# Patient Record
Sex: MALE | Race: WHITE | HISPANIC OR LATINO | Employment: STUDENT | URBAN - METROPOLITAN AREA
[De-identification: names, ages, dates, MRNs, and addresses within clinical notes are randomized per-mention and may not be internally consistent; named-entity substitution may affect disease eponyms.]

---

## 2019-06-24 ENCOUNTER — HOSPITAL ENCOUNTER (INPATIENT)
Facility: MEDICAL CENTER | Age: 24
LOS: 2 days | DRG: 857 | End: 2019-06-26
Attending: EMERGENCY MEDICINE | Admitting: INTERNAL MEDICINE
Payer: COMMERCIAL

## 2019-06-24 ENCOUNTER — ANESTHESIA EVENT (OUTPATIENT)
Dept: SURGERY | Facility: MEDICAL CENTER | Age: 24
DRG: 857 | End: 2019-06-24
Payer: COMMERCIAL

## 2019-06-24 ENCOUNTER — APPOINTMENT (OUTPATIENT)
Dept: RADIOLOGY | Facility: MEDICAL CENTER | Age: 24
DRG: 857 | End: 2019-06-24
Attending: EMERGENCY MEDICINE
Payer: COMMERCIAL

## 2019-06-24 ENCOUNTER — ANESTHESIA (OUTPATIENT)
Dept: SURGERY | Facility: MEDICAL CENTER | Age: 24
DRG: 857 | End: 2019-06-24
Payer: COMMERCIAL

## 2019-06-24 DIAGNOSIS — K04.7 DENTAL INFECTION: ICD-10-CM

## 2019-06-24 DIAGNOSIS — L02.01 FACIAL ABSCESS: ICD-10-CM

## 2019-06-24 PROBLEM — D72.829 LEUKOCYTOSIS: Status: ACTIVE | Noted: 2019-06-24

## 2019-06-24 PROBLEM — Z72.0 TOBACCO ABUSE: Status: ACTIVE | Noted: 2019-06-24

## 2019-06-24 PROBLEM — R73.9 HYPERGLYCEMIA: Status: ACTIVE | Noted: 2019-06-24

## 2019-06-24 PROBLEM — R06.00 DYSPNEA: Status: ACTIVE | Noted: 2019-06-24

## 2019-06-24 LAB
ALBUMIN SERPL BCP-MCNC: 4.2 G/DL (ref 3.2–4.9)
ALBUMIN/GLOB SERPL: 1.3 G/DL
ALP SERPL-CCNC: 67 U/L (ref 30–99)
ALT SERPL-CCNC: 38 U/L (ref 2–50)
ANION GAP SERPL CALC-SCNC: 11 MMOL/L (ref 0–11.9)
AST SERPL-CCNC: 18 U/L (ref 12–45)
BASOPHILS # BLD AUTO: 0.3 % (ref 0–1.8)
BASOPHILS # BLD: 0.08 K/UL (ref 0–0.12)
BILIRUB SERPL-MCNC: 1.5 MG/DL (ref 0.1–1.5)
BLOOD CULTURE HOLD CXBCH: NORMAL
BUN SERPL-MCNC: 9 MG/DL (ref 8–22)
CALCIUM SERPL-MCNC: 9.6 MG/DL (ref 8.5–10.5)
CHLORIDE SERPL-SCNC: 104 MMOL/L (ref 96–112)
CO2 SERPL-SCNC: 27 MMOL/L (ref 20–33)
CREAT SERPL-MCNC: 0.84 MG/DL (ref 0.5–1.4)
EOSINOPHIL # BLD AUTO: 0.13 K/UL (ref 0–0.51)
EOSINOPHIL NFR BLD: 0.6 % (ref 0–6.9)
ERYTHROCYTE [DISTWIDTH] IN BLOOD BY AUTOMATED COUNT: 43.4 FL (ref 35.9–50)
GLOBULIN SER CALC-MCNC: 3.2 G/DL (ref 1.9–3.5)
GLUCOSE SERPL-MCNC: 104 MG/DL (ref 65–99)
HCT VFR BLD AUTO: 49.5 % (ref 42–52)
HGB BLD-MCNC: 16.6 G/DL (ref 14–18)
IMM GRANULOCYTES # BLD AUTO: 0.25 K/UL (ref 0–0.11)
IMM GRANULOCYTES NFR BLD AUTO: 1.1 % (ref 0–0.9)
LIPASE SERPL-CCNC: 4 U/L (ref 11–82)
LYMPHOCYTES # BLD AUTO: 2.93 K/UL (ref 1–4.8)
LYMPHOCYTES NFR BLD: 12.7 % (ref 22–41)
MCH RBC QN AUTO: 31.7 PG (ref 27–33)
MCHC RBC AUTO-ENTMCNC: 33.5 G/DL (ref 33.7–35.3)
MCV RBC AUTO: 94.5 FL (ref 81.4–97.8)
MONOCYTES # BLD AUTO: 2.04 K/UL (ref 0–0.85)
MONOCYTES NFR BLD AUTO: 8.9 % (ref 0–13.4)
NEUTROPHILS # BLD AUTO: 17.58 K/UL (ref 1.82–7.42)
NEUTROPHILS NFR BLD: 76.4 % (ref 44–72)
NRBC # BLD AUTO: 0 K/UL
NRBC BLD-RTO: 0 /100 WBC
PLATELET # BLD AUTO: 420 K/UL (ref 164–446)
PMV BLD AUTO: 9.8 FL (ref 9–12.9)
POTASSIUM SERPL-SCNC: 3.9 MMOL/L (ref 3.6–5.5)
PROT SERPL-MCNC: 7.4 G/DL (ref 6–8.2)
RBC # BLD AUTO: 5.24 M/UL (ref 4.7–6.1)
SODIUM SERPL-SCNC: 142 MMOL/L (ref 135–145)
WBC # BLD AUTO: 23 K/UL (ref 4.8–10.8)

## 2019-06-24 PROCEDURE — 501838 HCHG SUTURE GENERAL: Performed by: ORAL & MAXILLOFACIAL SURGERY

## 2019-06-24 PROCEDURE — 700102 HCHG RX REV CODE 250 W/ 637 OVERRIDE(OP)

## 2019-06-24 PROCEDURE — 0J910ZZ DRAINAGE OF FACE SUBCUTANEOUS TISSUE AND FASCIA, OPEN APPROACH: ICD-10-PCS | Performed by: ORAL & MAXILLOFACIAL SURGERY

## 2019-06-24 PROCEDURE — A9270 NON-COVERED ITEM OR SERVICE: HCPCS | Performed by: ORAL & MAXILLOFACIAL SURGERY

## 2019-06-24 PROCEDURE — 160002 HCHG RECOVERY MINUTES (STAT): Performed by: ORAL & MAXILLOFACIAL SURGERY

## 2019-06-24 PROCEDURE — 700105 HCHG RX REV CODE 258: Performed by: INTERNAL MEDICINE

## 2019-06-24 PROCEDURE — 770006 HCHG ROOM/CARE - MED/SURG/GYN SEMI*

## 2019-06-24 PROCEDURE — 99407 BEHAV CHNG SMOKING > 10 MIN: CPT | Performed by: INTERNAL MEDICINE

## 2019-06-24 PROCEDURE — 700117 HCHG RX CONTRAST REV CODE 255: Performed by: EMERGENCY MEDICINE

## 2019-06-24 PROCEDURE — 700111 HCHG RX REV CODE 636 W/ 250 OVERRIDE (IP): Performed by: ANESTHESIOLOGY

## 2019-06-24 PROCEDURE — 87070 CULTURE OTHR SPECIMN AEROBIC: CPT

## 2019-06-24 PROCEDURE — 700111 HCHG RX REV CODE 636 W/ 250 OVERRIDE (IP): Performed by: EMERGENCY MEDICINE

## 2019-06-24 PROCEDURE — 160027 HCHG SURGERY MINUTES - 1ST 30 MINS LEVEL 2: Performed by: ORAL & MAXILLOFACIAL SURGERY

## 2019-06-24 PROCEDURE — 87075 CULTR BACTERIA EXCEPT BLOOD: CPT

## 2019-06-24 PROCEDURE — 700111 HCHG RX REV CODE 636 W/ 250 OVERRIDE (IP): Performed by: HOSPITALIST

## 2019-06-24 PROCEDURE — 80053 COMPREHEN METABOLIC PANEL: CPT

## 2019-06-24 PROCEDURE — 96375 TX/PRO/DX INJ NEW DRUG ADDON: CPT

## 2019-06-24 PROCEDURE — 700101 HCHG RX REV CODE 250: Performed by: ANESTHESIOLOGY

## 2019-06-24 PROCEDURE — 99221 1ST HOSP IP/OBS SF/LOW 40: CPT | Mod: 25 | Performed by: INTERNAL MEDICINE

## 2019-06-24 PROCEDURE — 700101 HCHG RX REV CODE 250: Performed by: ORAL & MAXILLOFACIAL SURGERY

## 2019-06-24 PROCEDURE — A9270 NON-COVERED ITEM OR SERVICE: HCPCS

## 2019-06-24 PROCEDURE — 700105 HCHG RX REV CODE 258: Performed by: EMERGENCY MEDICINE

## 2019-06-24 PROCEDURE — 700102 HCHG RX REV CODE 250 W/ 637 OVERRIDE(OP): Performed by: ORAL & MAXILLOFACIAL SURGERY

## 2019-06-24 PROCEDURE — 70355 PANORAMIC X-RAY OF JAWS: CPT

## 2019-06-24 PROCEDURE — 0WB30ZZ EXCISION OF ORAL CAVITY AND THROAT, OPEN APPROACH: ICD-10-PCS | Performed by: ORAL & MAXILLOFACIAL SURGERY

## 2019-06-24 PROCEDURE — 700111 HCHG RX REV CODE 636 W/ 250 OVERRIDE (IP): Performed by: INTERNAL MEDICINE

## 2019-06-24 PROCEDURE — 87040 BLOOD CULTURE FOR BACTERIA: CPT

## 2019-06-24 PROCEDURE — 700105 HCHG RX REV CODE 258: Performed by: HOSPITALIST

## 2019-06-24 PROCEDURE — 96365 THER/PROPH/DIAG IV INF INIT: CPT

## 2019-06-24 PROCEDURE — 160009 HCHG ANES TIME/MIN: Performed by: ORAL & MAXILLOFACIAL SURGERY

## 2019-06-24 PROCEDURE — 99285 EMERGENCY DEPT VISIT HI MDM: CPT

## 2019-06-24 PROCEDURE — 83690 ASSAY OF LIPASE: CPT

## 2019-06-24 PROCEDURE — 160048 HCHG OR STATISTICAL LEVEL 1-5: Performed by: ORAL & MAXILLOFACIAL SURGERY

## 2019-06-24 PROCEDURE — A9270 NON-COVERED ITEM OR SERVICE: HCPCS | Performed by: INTERNAL MEDICINE

## 2019-06-24 PROCEDURE — 160036 HCHG PACU - EA ADDL 30 MINS PHASE I: Performed by: ORAL & MAXILLOFACIAL SURGERY

## 2019-06-24 PROCEDURE — 85025 COMPLETE CBC W/AUTO DIFF WBC: CPT

## 2019-06-24 PROCEDURE — 160035 HCHG PACU - 1ST 60 MINS PHASE I: Performed by: ORAL & MAXILLOFACIAL SURGERY

## 2019-06-24 PROCEDURE — 70491 CT SOFT TISSUE NECK W/DYE: CPT

## 2019-06-24 PROCEDURE — 700102 HCHG RX REV CODE 250 W/ 637 OVERRIDE(OP): Performed by: INTERNAL MEDICINE

## 2019-06-24 PROCEDURE — 160038 HCHG SURGERY MINUTES - EA ADDL 1 MIN LEVEL 2: Performed by: ORAL & MAXILLOFACIAL SURGERY

## 2019-06-24 PROCEDURE — 700105 HCHG RX REV CODE 258: Performed by: ANESTHESIOLOGY

## 2019-06-24 PROCEDURE — 87205 SMEAR GRAM STAIN: CPT

## 2019-06-24 RX ORDER — BISACODYL 10 MG
10 SUPPOSITORY, RECTAL RECTAL
Status: DISCONTINUED | OUTPATIENT
Start: 2019-06-24 | End: 2019-06-26 | Stop reason: HOSPADM

## 2019-06-24 RX ORDER — IBUPROFEN 800 MG/1
800 TABLET ORAL EVERY 8 HOURS PRN
COMMUNITY

## 2019-06-24 RX ORDER — OXYCODONE HYDROCHLORIDE AND ACETAMINOPHEN 5; 325 MG/1; MG/1
1 TABLET ORAL
Status: DISCONTINUED | OUTPATIENT
Start: 2019-06-24 | End: 2019-06-24 | Stop reason: HOSPADM

## 2019-06-24 RX ORDER — MEPERIDINE HYDROCHLORIDE 25 MG/ML
12.5 INJECTION INTRAMUSCULAR; INTRAVENOUS; SUBCUTANEOUS
Status: DISCONTINUED | OUTPATIENT
Start: 2019-06-24 | End: 2019-06-24 | Stop reason: HOSPADM

## 2019-06-24 RX ORDER — HYDROMORPHONE HYDROCHLORIDE 1 MG/ML
0.1 INJECTION, SOLUTION INTRAMUSCULAR; INTRAVENOUS; SUBCUTANEOUS
Status: DISCONTINUED | OUTPATIENT
Start: 2019-06-24 | End: 2019-06-24 | Stop reason: HOSPADM

## 2019-06-24 RX ORDER — SODIUM CHLORIDE, SODIUM LACTATE, POTASSIUM CHLORIDE, CALCIUM CHLORIDE 600; 310; 30; 20 MG/100ML; MG/100ML; MG/100ML; MG/100ML
INJECTION, SOLUTION INTRAVENOUS
Status: DISCONTINUED | OUTPATIENT
Start: 2019-06-24 | End: 2019-06-24 | Stop reason: SURG

## 2019-06-24 RX ORDER — MORPHINE SULFATE 4 MG/ML
4 INJECTION, SOLUTION INTRAMUSCULAR; INTRAVENOUS ONCE
Status: COMPLETED | OUTPATIENT
Start: 2019-06-24 | End: 2019-06-24

## 2019-06-24 RX ORDER — OXYCODONE HCL 5 MG/5 ML
SOLUTION, ORAL ORAL
Status: COMPLETED
Start: 2019-06-24 | End: 2019-06-24

## 2019-06-24 RX ORDER — DIPHENHYDRAMINE HYDROCHLORIDE 50 MG/ML
12.5 INJECTION INTRAMUSCULAR; INTRAVENOUS
Status: DISCONTINUED | OUTPATIENT
Start: 2019-06-24 | End: 2019-06-24 | Stop reason: HOSPADM

## 2019-06-24 RX ORDER — ONDANSETRON 4 MG/1
4 TABLET, ORALLY DISINTEGRATING ORAL EVERY 4 HOURS PRN
Status: DISCONTINUED | OUTPATIENT
Start: 2019-06-24 | End: 2019-06-26 | Stop reason: HOSPADM

## 2019-06-24 RX ORDER — HYDROMORPHONE HYDROCHLORIDE 1 MG/ML
0.2 INJECTION, SOLUTION INTRAMUSCULAR; INTRAVENOUS; SUBCUTANEOUS
Status: DISCONTINUED | OUTPATIENT
Start: 2019-06-24 | End: 2019-06-24 | Stop reason: HOSPADM

## 2019-06-24 RX ORDER — HYDROMORPHONE HYDROCHLORIDE 1 MG/ML
0.4 INJECTION, SOLUTION INTRAMUSCULAR; INTRAVENOUS; SUBCUTANEOUS
Status: DISCONTINUED | OUTPATIENT
Start: 2019-06-24 | End: 2019-06-24 | Stop reason: HOSPADM

## 2019-06-24 RX ORDER — KETOROLAC TROMETHAMINE 30 MG/ML
INJECTION, SOLUTION INTRAMUSCULAR; INTRAVENOUS PRN
Status: DISCONTINUED | OUTPATIENT
Start: 2019-06-24 | End: 2019-06-24 | Stop reason: SURG

## 2019-06-24 RX ORDER — PROMETHAZINE HYDROCHLORIDE 12.5 MG/1
12.5-25 SUPPOSITORY RECTAL EVERY 4 HOURS PRN
Status: DISCONTINUED | OUTPATIENT
Start: 2019-06-24 | End: 2019-06-26 | Stop reason: HOSPADM

## 2019-06-24 RX ORDER — ENALAPRILAT 1.25 MG/ML
1.25 INJECTION INTRAVENOUS EVERY 6 HOURS PRN
Status: DISCONTINUED | OUTPATIENT
Start: 2019-06-24 | End: 2019-06-26 | Stop reason: HOSPADM

## 2019-06-24 RX ORDER — ACETAMINOPHEN 325 MG/1
650 TABLET ORAL EVERY 6 HOURS PRN
Status: DISCONTINUED | OUTPATIENT
Start: 2019-06-24 | End: 2019-06-26 | Stop reason: HOSPADM

## 2019-06-24 RX ORDER — OXYCODONE HYDROCHLORIDE 5 MG/1
5 TABLET ORAL
Status: DISCONTINUED | OUTPATIENT
Start: 2019-06-24 | End: 2019-06-26 | Stop reason: HOSPADM

## 2019-06-24 RX ORDER — POLYETHYLENE GLYCOL 3350 17 G/17G
1 POWDER, FOR SOLUTION ORAL
Status: DISCONTINUED | OUTPATIENT
Start: 2019-06-24 | End: 2019-06-26 | Stop reason: HOSPADM

## 2019-06-24 RX ORDER — DEXAMETHASONE 4 MG/1
2 TABLET ORAL 2 TIMES DAILY
Status: ON HOLD | COMMUNITY
End: 2019-06-26

## 2019-06-24 RX ORDER — AMOXICILLIN 250 MG
2 CAPSULE ORAL 2 TIMES DAILY
Status: DISCONTINUED | OUTPATIENT
Start: 2019-06-24 | End: 2019-06-26 | Stop reason: HOSPADM

## 2019-06-24 RX ORDER — SODIUM CHLORIDE 9 MG/ML
INJECTION, SOLUTION INTRAVENOUS CONTINUOUS
Status: DISCONTINUED | OUTPATIENT
Start: 2019-06-24 | End: 2019-06-26 | Stop reason: HOSPADM

## 2019-06-24 RX ORDER — HYDROMORPHONE HYDROCHLORIDE 1 MG/ML
0.5 INJECTION, SOLUTION INTRAMUSCULAR; INTRAVENOUS; SUBCUTANEOUS
Status: DISCONTINUED | OUTPATIENT
Start: 2019-06-24 | End: 2019-06-26 | Stop reason: HOSPADM

## 2019-06-24 RX ORDER — OXYCODONE HCL 5 MG/5 ML
10 SOLUTION, ORAL ORAL ONCE
Status: COMPLETED | OUTPATIENT
Start: 2019-06-24 | End: 2019-06-24

## 2019-06-24 RX ORDER — HALOPERIDOL 5 MG/ML
1 INJECTION INTRAMUSCULAR
Status: DISCONTINUED | OUTPATIENT
Start: 2019-06-24 | End: 2019-06-24 | Stop reason: HOSPADM

## 2019-06-24 RX ORDER — BUPIVACAINE HYDROCHLORIDE AND EPINEPHRINE 5; 5 MG/ML; UG/ML
INJECTION, SOLUTION EPIDURAL; INTRACAUDAL; PERINEURAL
Status: DISCONTINUED | OUTPATIENT
Start: 2019-06-24 | End: 2019-06-24 | Stop reason: HOSPADM

## 2019-06-24 RX ORDER — PROMETHAZINE HYDROCHLORIDE 25 MG/1
12.5-25 TABLET ORAL EVERY 4 HOURS PRN
Status: DISCONTINUED | OUTPATIENT
Start: 2019-06-24 | End: 2019-06-26 | Stop reason: HOSPADM

## 2019-06-24 RX ORDER — OXYCODONE HYDROCHLORIDE 10 MG/1
10 TABLET ORAL
Status: DISCONTINUED | OUTPATIENT
Start: 2019-06-24 | End: 2019-06-26 | Stop reason: HOSPADM

## 2019-06-24 RX ORDER — OXYCODONE HYDROCHLORIDE AND ACETAMINOPHEN 5; 325 MG/1; MG/1
2 TABLET ORAL
Status: DISCONTINUED | OUTPATIENT
Start: 2019-06-24 | End: 2019-06-24 | Stop reason: HOSPADM

## 2019-06-24 RX ORDER — ONDANSETRON 2 MG/ML
4 INJECTION INTRAMUSCULAR; INTRAVENOUS EVERY 4 HOURS PRN
Status: DISCONTINUED | OUTPATIENT
Start: 2019-06-24 | End: 2019-06-26 | Stop reason: HOSPADM

## 2019-06-24 RX ORDER — DIPHENHYDRAMINE HYDROCHLORIDE 50 MG/ML
25 INJECTION INTRAMUSCULAR; INTRAVENOUS ONCE
Status: COMPLETED | OUTPATIENT
Start: 2019-06-24 | End: 2019-06-24

## 2019-06-24 RX ORDER — SODIUM CHLORIDE, SODIUM LACTATE, POTASSIUM CHLORIDE, CALCIUM CHLORIDE 600; 310; 30; 20 MG/100ML; MG/100ML; MG/100ML; MG/100ML
INJECTION, SOLUTION INTRAVENOUS CONTINUOUS
Status: DISCONTINUED | OUTPATIENT
Start: 2019-06-24 | End: 2019-06-24 | Stop reason: HOSPADM

## 2019-06-24 RX ORDER — ONDANSETRON 2 MG/ML
4 INJECTION INTRAMUSCULAR; INTRAVENOUS
Status: DISCONTINUED | OUTPATIENT
Start: 2019-06-24 | End: 2019-06-24 | Stop reason: HOSPADM

## 2019-06-24 RX ADMIN — ACETAMINOPHEN 650 MG: 325 TABLET, FILM COATED ORAL at 18:00

## 2019-06-24 RX ADMIN — OXYCODONE HYDROCHLORIDE 10 MG: 10 TABLET ORAL at 18:00

## 2019-06-24 RX ADMIN — VANCOMYCIN HYDROCHLORIDE 1300 MG: 100 INJECTION, POWDER, LYOPHILIZED, FOR SOLUTION INTRAVENOUS at 23:46

## 2019-06-24 RX ADMIN — AMPICILLIN AND SULBACTAM 3 G: 1; 2 INJECTION, POWDER, FOR SOLUTION INTRAMUSCULAR; INTRAVENOUS at 16:03

## 2019-06-24 RX ADMIN — IOHEXOL 80 ML: 350 INJECTION, SOLUTION INTRAVENOUS at 15:04

## 2019-06-24 RX ADMIN — AMPICILLIN SODIUM AND SULBACTAM SODIUM 3 G: 2; 1 INJECTION, POWDER, FOR SOLUTION INTRAMUSCULAR; INTRAVENOUS at 22:52

## 2019-06-24 RX ADMIN — VANCOMYCIN HYDROCHLORIDE 2100 MG: 100 INJECTION, POWDER, LYOPHILIZED, FOR SOLUTION INTRAVENOUS at 17:50

## 2019-06-24 RX ADMIN — OXYCODONE HYDROCHLORIDE 10 MG: 5 SOLUTION ORAL at 20:07

## 2019-06-24 RX ADMIN — PROPOFOL 200 MG: 10 INJECTION, EMULSION INTRAVENOUS at 19:10

## 2019-06-24 RX ADMIN — SODIUM CHLORIDE: 9 INJECTION, SOLUTION INTRAVENOUS at 17:50

## 2019-06-24 RX ADMIN — SODIUM CHLORIDE, POTASSIUM CHLORIDE, SODIUM LACTATE AND CALCIUM CHLORIDE: 600; 310; 30; 20 INJECTION, SOLUTION INTRAVENOUS at 18:50

## 2019-06-24 RX ADMIN — KETOROLAC TROMETHAMINE 30 MG: 30 INJECTION, SOLUTION INTRAMUSCULAR at 19:37

## 2019-06-24 RX ADMIN — FENTANYL CITRATE 100 MCG: 50 INJECTION, SOLUTION INTRAMUSCULAR; INTRAVENOUS at 19:10

## 2019-06-24 RX ADMIN — MORPHINE SULFATE 4 MG: 4 INJECTION INTRAVENOUS at 13:51

## 2019-06-24 RX ADMIN — Medication 10 MG: at 20:07

## 2019-06-24 RX ADMIN — MIDAZOLAM HYDROCHLORIDE 2 MG: 1 INJECTION, SOLUTION INTRAMUSCULAR; INTRAVENOUS at 18:50

## 2019-06-24 RX ADMIN — SUCCINYLCHOLINE CHLORIDE 100 MG: 20 INJECTION, SOLUTION INTRAMUSCULAR; INTRAVENOUS at 19:10

## 2019-06-24 RX ADMIN — DIPHENHYDRAMINE HYDROCHLORIDE 25 MG: 50 INJECTION INTRAMUSCULAR; INTRAVENOUS at 23:46

## 2019-06-24 ASSESSMENT — ENCOUNTER SYMPTOMS
LOSS OF CONSCIOUSNESS: 0
PALPITATIONS: 0
WEAKNESS: 0
SHORTNESS OF BREATH: 0
COUGH: 0
FALLS: 0
TINGLING: 0
SHORTNESS OF BREATH: 1
ABDOMINAL PAIN: 0
STRIDOR: 0
HEADACHES: 0
CONSTIPATION: 0
FEVER: 0
CHILLS: 0
DEPRESSION: 0
SPUTUM PRODUCTION: 0
SENSORY CHANGE: 0
DIZZINESS: 0
MYALGIAS: 0
FEVER: 1
VOMITING: 0
NAUSEA: 0
DIARRHEA: 0

## 2019-06-24 ASSESSMENT — COGNITIVE AND FUNCTIONAL STATUS - GENERAL
SUGGESTED CMS G CODE MODIFIER MOBILITY: CH
MOBILITY SCORE: 24
SUGGESTED CMS G CODE MODIFIER DAILY ACTIVITY: CH
DAILY ACTIVITIY SCORE: 24

## 2019-06-24 ASSESSMENT — LIFESTYLE VARIABLES
HAVE YOU EVER FELT YOU SHOULD CUT DOWN ON YOUR DRINKING: NO
ALCOHOL_USE: YES
EVER HAD A DRINK FIRST THING IN THE MORNING TO STEADY YOUR NERVES TO GET RID OF A HANGOVER: NO
ON A TYPICAL DAY WHEN YOU DRINK ALCOHOL HOW MANY DRINKS DO YOU HAVE: 6
AVERAGE NUMBER OF DAYS PER WEEK YOU HAVE A DRINK CONTAINING ALCOHOL: 2
TOTAL SCORE: 2
HAVE PEOPLE ANNOYED YOU BY CRITICIZING YOUR DRINKING: YES
DOES PATIENT WANT TO TALK TO SOMEONE ABOUT QUITTING: NO
TOTAL SCORE: 2
HOW MANY TIMES IN THE PAST YEAR HAVE YOU HAD 5 OR MORE DRINKS IN A DAY: 3
TOTAL SCORE: 2
DOES PATIENT WANT TO STOP DRINKING: YES
CONSUMPTION TOTAL: POSITIVE
EVER FELT BAD OR GUILTY ABOUT YOUR DRINKING: YES
EVER_SMOKED: YES

## 2019-06-24 ASSESSMENT — PATIENT HEALTH QUESTIONNAIRE - PHQ9
2. FEELING DOWN, DEPRESSED, IRRITABLE, OR HOPELESS: NOT AT ALL
SUM OF ALL RESPONSES TO PHQ9 QUESTIONS 1 AND 2: 0
1. LITTLE INTEREST OR PLEASURE IN DOING THINGS: NOT AT ALL

## 2019-06-24 NOTE — ED PROVIDER NOTES
ED Provider Note    Scribed for Tabatha Maldonado M.D. by Austin Diallo. 6/24/2019, 1:00 PM.    Means of arrival: Walk-in  History obtained from: Patient  History limited by: None    CHIEF COMPLAINT  Chief Complaint   Patient presents with   • Facial Swelling     wisdom teeth removed last Tuesday       RENAN Sandhu is a 23 y.o. male who presents to the Emergency Department complaining of acute, worsening facial swelling onset 3 days ago. The patient states that he had his wisdom teeth removed last night, but states this morning he had increased swelling. He notes that he made attempts to follow-up with his dentist, but states that his dentist was unavailable today. The patient endorses associated light pink discharge from surgical incision inside his mouth, pain in the face, and dysphagia, but denies any loss of sensation or dyspnea.  He describes the pain as throbbing and moderate in severity.  The patient states the he has been applying ice to his face to alleviate his pain, but states that his has not alleviated his swelling. He was given antibiotics, steroids, tylenol and codeine to treat post-operative symptoms but states that he finished his medications last night.     REVIEW OF SYSTEMS  Review of Systems   Constitutional: Negative for chills and fever.   HENT:        Positive for facial swelling and facial pain.   Positive for light pink discharge.    Respiratory: Negative for shortness of breath.    Neurological: Negative for sensory change.   All other systems reviewed and are negative.      PAST MEDICAL HISTORY   none    SURGICAL HISTORY  patient denies any surgical history    SOCIAL HISTORY  Social History   Substance Use Topics   • Smoking status: No   • Smokeless tobacco: No   • Alcohol use No        FAMILY HISTORY  Non pertinent     CURRENT MEDICATIONS  Home Medications    non         ALLERGIES  No Known Allergies    PHYSICAL EXAM  VITAL SIGNS: /75   Pulse 80   Temp 36.5 °C (97.7 °F)  "(Temporal)   Resp 14   Ht 1.753 m (5' 9\")   Wt 85.4 kg (188 lb 4.4 oz)   SpO2 98%   BMI 27.80 kg/m²   Vitals reviewed by myself.  Physical Exam  Nursing note and vitals reviewed.  Constitutional: Well-developed and well-nourished.  Patient appears uncomfortable  HENT: Head is notable for significant left-sided facial swelling with associated trismus secondary to the pain in his left jaw.  Difficult to assess oropharynx given patient's trismus.  Patient is managing his secretions without difficulty and is able to speak in full sentences without muffled voice  Eyes: extra-ocular movements intact  Cardiovascular: Normal rate and regular rhythm. No murmur heard.  Pulmonary/Chest: Breath sounds normal. No wheezes or rales.   Abdominal: Soft and non-tender. No distention.    Musculoskeletal: Extremities exhibit normal range of motion without edema or tenderness.   Neurological: Awake and alert  Skin: Skin is warm and dry. No rash.       DIAGNOSTIC STUDIES /  LABS  Labs Reviewed   CBC WITH DIFFERENTIAL - Abnormal; Notable for the following:        Result Value    WBC 23.0 (*)     MCHC 33.5 (*)     Neutrophils-Polys 76.40 (*)     Lymphocytes 12.70 (*)     Immature Granulocytes 1.10 (*)     Neutrophils (Absolute) 17.58 (*)     Monos (Absolute) 2.04 (*)     Immature Granulocytes (abs) 0.25 (*)     All other components within normal limits   COMP METABOLIC PANEL - Abnormal; Notable for the following:     Glucose 104 (*)     All other components within normal limits   LIPASE - Abnormal; Notable for the following:     Lipase 4 (*)     All other components within normal limits   ESTIMATED GFR   BLOOD CULTURE    Narrative:     Per Hospital Policy: Only change Specimen Src: to \"Line\" if  specified by physician order.       All labs reviewed by me.    RADIOLOGY  [id number]   Final Result      Status post left lower wisdom tooth extraction.      CT-SOFT TISSUE NECK WITH   Final Result      4 cm abscess centered deep " and inferior to the left mandibular third molar extraction socket where there is medial cortex slight fracture      Small retropharyngeal fluid could be reactive. This superinfection/abscess is not excluded      Moderate mass effect upon the giuseppe and hypopharyngeal airway from the left inflammatory/phlegmonous change.      Overlying cellulitis      Findings were discussed with WILFREDO MARADIAGA on 6/24/2019 3:20 PM.        The radiologist's interpretation of all radiological studies have been reviewed by me.    REASSESSMENT  1:10 PM - Patient seen and examined at bedside. Discussed plan of care, including ordering labs and imaging. Patient agrees to the plan of care.     3:26 PM - Patient was reevaluated at bedside. Discussed lab and radiology results with the patient and informed them that I would be starting them on IV antibiotics at this time.     3:58 PM - I discussed the patient's case and the above findings with Dr. Rivera (Oral surgery) who stated that he would preform surgery on the patient tonight and asked me to admit the patient to the hospitalist.     4:02 PM - Patient was reevaluated at bedside. I outlined my plan to admit them at this time and the patient was understanding and agreeable to admission.    COURSE & MEDICAL DECISION MAKING  Nursing notes, VS, PMSFHx reviewed in chart.    Patient is a 23-year-old male who comes in for left-sided facial swelling after recent surgery.  Differential diagnosis includes normal postoperative swelling, abscess, dental infection.  Diagnostic work-up includes labs and CT of the face.  Next    Patient's initial vitals are within normal limits, despite his significant facial swelling and trismus patient is still managing his airway, swallowing his secretions normally, and has a normal voice.  Patient's pain is managed with morphine after which he feels improved.  Labs returned and are negative for leukocytosis of [id number].  CT soft tissue neck returns and demonstrates a 4  Center meter abscess that is deep and inferior to the left mandibular third molar with associated swelling and fluid collection in the retropharyngeal space, there is also moderate mass-effect upon the oropharyngeal airway.  Patient is started on vancomycin and Unasyn for infection.  Of note during his emergency department course patient's airway remained intact and he had no respiratory difficulty.  I discussed the case with patient's initial surgeon Dr. Sanchez who advises that he does oral surgery but is not an oral surgeon for this hospital.  Therefore he advises that oral surgeon on-call be consulted.  I discussed the case with Dr. Rivera who advises that patient be admitted and continue antibiotics, he plans to take the patient to surgery for further management of abscess tonight.  Patient understands and is agreeable to this plan.  I discussed the case with Dr. Barriga who is accepted the admission.  At time of admission patient is in guarded condition.      DISPOSITION:  4:03 PM Spoke with Dr. Barriga regarding the patient.  Patient will be admitted in guarded condition.    FINAL IMPRESSION  1. Facial abscess    2. Dental infection         ITabatha M.D. personally performed the services described in this documentation, as scribed by Austin Diallo in my presence, and it is both accurate and complete.    C.    The note accurately reflects work and decisions made by me.  Tabatha Maldonado  6/24/2019  5:47 PM

## 2019-06-24 NOTE — H&P
Hospital Medicine History & Physical Note    Date of Service  6/24/2019    Primary Care Physician  None    Consultants  Oral surgery    Code Status  Full    Chief Complaint  Left facial swelling and pain    History of Presenting Illness  23 y.o. male who presented 6/24/2019 with left facial pain and swelling.  Patient states he has his wisdom teeth out a week ago.  He started having pain and swelling which became worse on Friday.  Today it was markedly worse so he presented to the emergency department.  Patient states over the last couple of nights he has woke up in the middle night with some shortness of breath.  At times he is also noted difficulty swallowing due to pain.  He also complains of a low-grade fever.  Because of the pain with swallowing he has had decreased oral intake.  Upon arrival, patient was noted to have a profound dental abscess.  I did discuss the case including labs and imaging with the ER physician.    Review of Systems  Review of Systems   Constitutional: Positive for fever. Negative for chills and malaise/fatigue.        Decreased oral intake due to pain with swallowing    HENT: Negative for congestion.         Left facial pain and swelling    Respiratory: Positive for shortness of breath. Negative for cough, sputum production and stridor.    Cardiovascular: Negative for chest pain, palpitations and leg swelling.   Gastrointestinal: Negative for abdominal pain, constipation, diarrhea, nausea and vomiting.   Genitourinary: Negative for dysuria and urgency.   Musculoskeletal: Negative for falls and myalgias.   Neurological: Negative for dizziness, tingling, loss of consciousness, weakness and headaches.   Psychiatric/Behavioral: Negative for depression and suicidal ideas.   All other systems reviewed and are negative.      Past Medical History  None    Surgical History  [place] teeth removal    Family History  Reviewed, noncontributory    Social History   Patient does admit to occasional  tobacco use, occasional alcohol use but denies any illicit drug use    Allergies  No Known Allergies    Medications  Prior to Admission Medications   Prescriptions Last Dose Informant Patient Reported? Taking?   acetaminophen-codeine #3 (TYLENOL #3) 300-30 MG Tab 6/23/2019 at PM Patient Yes Yes   Sig: Take 1 Tab by mouth every 8 hours as needed.   dexamethasone (DECADRON) 4 MG Tab 6/23/2019 at PM Patient Yes Yes   Sig: Take 2 mg by mouth 2 times a day.   ibuprofen (MOTRIN) 800 MG Tab 6/23/2019 at PM Patient Yes Yes   Sig: Take 800 mg by mouth every 8 hours as needed (PAIN).      Facility-Administered Medications: None       Physical Exam  Temp:  [36.5 °C (97.7 °F)] 36.5 °C (97.7 °F)  Pulse:  [80-85] 85  Resp:  [14] 14  BP: (141)/(75) 141/75  SpO2:  [97 %-98 %] 97 %    Physical Exam   Constitutional: He is oriented to person, place, and time. He appears well-developed and well-nourished.  Non-toxic appearance. No distress.   HENT:   Head: Normocephalic and atraumatic. Not macrocephalic and not microcephalic. Head is without raccoon's eyes and without Souza's sign.   Right Ear: External ear normal.   Left Ear: External ear normal.   Mouth/Throat: Mucous membranes are dry. No oropharyngeal exudate.   Significant left facial pain and swelling with significant induration  Induration approaches midline    Eyes: Conjunctivae are normal. Right eye exhibits no discharge. Left eye exhibits no discharge. No scleral icterus.   Neck: Normal range of motion. Neck supple. No tracheal deviation, no edema and no erythema present.   Cardiovascular: Normal rate, regular rhythm, normal heart sounds and intact distal pulses.  Exam reveals no gallop, no friction rub and no decreased pulses.    No murmur heard.  Pulmonary/Chest: Effort normal and breath sounds normal. No stridor. No respiratory distress. He has no decreased breath sounds. He has no wheezes. He has no rhonchi. He has no rales. He exhibits no tenderness.   Abdominal: Soft.  Bowel sounds are normal. He exhibits no distension. There is no splenomegaly or hepatomegaly. There is no tenderness. There is no rebound and no guarding.   Musculoskeletal: Normal range of motion. He exhibits no edema, tenderness or deformity.   Lymphadenopathy:     He has no cervical adenopathy.   Neurological: He is alert and oriented to person, place, and time. No cranial nerve deficit. Coordination normal.   Skin: Skin is warm and dry. No rash noted. He is not diaphoretic. No cyanosis or erythema. No pallor. Nails show no clubbing.   Psychiatric: He has a normal mood and affect. His speech is normal and behavior is normal. Judgment and thought content normal. Cognition and memory are normal.   Nursing note and vitals reviewed.      Laboratory:  Recent Labs      06/24/19   1355   WBC  23.0*   RBC  5.24   HEMOGLOBIN  16.6   HEMATOCRIT  49.5   MCV  94.5   MCH  31.7   MCHC  33.5*   RDW  43.4   PLATELETCT  420   MPV  9.8     Recent Labs      06/24/19   1355   SODIUM  142   POTASSIUM  3.9   CHLORIDE  104   CO2  27   GLUCOSE  104*   BUN  9   CREATININE  0.84   CALCIUM  9.6     Recent Labs      06/24/19   1355   ALTSGPT  38   ASTSGOT  18   ALKPHOSPHAT  67   TBILIRUBIN  1.5   LIPASE  4*   GLUCOSE  104*                 No results for input(s): TROPONINI in the last 72 hours.    Urinalysis:    No results found     Imaging:  [id number]   Final Result      Status post left lower wisdom tooth extraction.      CT-SOFT TISSUE NECK WITH   Final Result      4 cm abscess centered deep and inferior to the left mandibular third molar extraction socket where there is medial cortex slight fracture      Small retropharyngeal fluid could be reactive. This superinfection/abscess is not excluded      Moderate mass effect upon the giuseppe and hypopharyngeal airway from the left inflammatory/phlegmonous change.      Overlying cellulitis      Findings were discussed with WILFREDO MARADIAGA on 6/24/2019 3:20 PM.             Assessment/Plan:  I anticipate this patient will require at least two midnights for appropriate medical management, necessitating inpatient admission.    * Dental abscess- (present on admission)   Assessment & Plan    -Post wisdom teeth extraction  -Significant abscess and cellulitis  -Start IV vancomycin and Unasyn  -Currently not causing sepsis  -I did personally review the CT scan, I noted a significant abscess and swelling, it does cause mass-effect on the airway but the airway is still widely patent  -He does require significant IV fluids as well as surgery, is at risk for significant worsening of these are not provided he therefore requires close monitoring in the hospital     Hyperglycemia   Assessment & Plan    -Mild worsening, no need for coverage  -Likely reactive  -Monitor glucose, if it does worsen he may need insulin sliding scale     Dyspnea   Assessment & Plan    -Likely due to mass-effect  -His oxygenation is good, closely monitor for worsening     Leukocytosis- (present on admission)   Assessment & Plan    -Profound elevation due to abscess  -No sepsis at this time  -Start IV fluids  -Repeat CBC in the morning     Tobacco abuse- (present on admission)   Assessment & Plan    -Tobacco cessation counseling and education provided for more than 10 minutes. Nicotine replacement options provided including patch, and further medical treatments including Wellbutrin and chantix.  As well as over the counter options of lozenges and gum.  -He denied the need for patch or gum         VTE prophylaxis: SCDs

## 2019-06-24 NOTE — ED NOTES
Pt presents to ER for facial swelling. PT had his wisdom teeth removed last week and sites this morning his right sided facial swelling became worst. PT is able to take in ful sentences but states its hard to swallow. PT states he finds that he is drooling in his sleep.  PT denies CP, SOB, changes in bowl/bladder, dizziness. PT states he has not called his dentist due to getting his wisdom teeth removed through [employer] and them not being at work today.  PT AOx4. PT in no distress at this time. Call bell within reach, Bed rails up. All needs addressed. Will continue to monitor.

## 2019-06-24 NOTE — PROGRESS NOTES
"Pharmacy Kinetics 23 y.o. male on vancomycin day # 1   2019    Vancomycin New Start     Indication for Treatment: Empiric - dental abscess    Pertinent history per medical record: Admitted on 2019 for dental abscess. Patient had wisdom teeth removed , reports facial swelling this morning. CT revealed dental abscess, thus empiric antibiotics initiated.    Other antibiotics: ampicillin/sulbactam 3 g IV q6hrs    Allergies: Patient has no known allergies.     Concern for renal function includes contrast for CT on .    Pertinent cultures to date:   : peripheral blood cx X2 - in process    MRSA nares swab if pneumonia is a concern (ordered/positive/negative/n-a): N/A    Imagin/24: CT soft tissue/neck - 4 cm abscess centered deep and inferior to the left mandibular third molar extraction socket where there is medial cortex slight fracture. Small retropharyngeal fluid could be reactive. This superinfection/abscess is not excluded.    Recent Labs      19   1355   WBC  23.0*   NEUTSPOLYS  76.40*     Recent Labs      19   1355   BUN  9   CREATININE  0.84   ALBUMIN  4.2     No results for input(s): VANCOTROUGH, VANCOPEAK, VANCORANDOM in the last 72 hours.    Intake/Output Summary (Last 24 hours) at 19 1652  Last data filed at 19 1633   Gross per 24 hour   Intake              100 ml   Output                0 ml   Net              100 ml      /75   Pulse 85   Temp 36.5 °C (97.7 °F) (Temporal)   Resp 14   Ht 1.753 m (5' 9\")   Wt 85.4 kg (188 lb 4.4 oz)   SpO2 97%  Temp (24hrs), Av.5 °C (97.7 °F), Min:36.5 °C (97.7 °F), Max:36.5 °C (97.7 °F)      A/P   1. Vancomycin dose change: Give vanco load 2100 mg IV x1 followed by vanco 1300 mg IV q8hrs  2. Next vancomycin level: ~2 days (not currently ordered)  3. Goal trough: 12-16 mcg/mL  4. Comments: Empiric vancomycin initiated for treatment of dental abscess, blood cultures in process. Patient with normal renal function " and low risk for accumulation. Will start scheduled q8hr vanco regimen per P&T protocol and plan trough at steady state to evaluate and adjust as necessary.     Pharmacy will continue to follow.     Sue Martin, JaradD

## 2019-06-24 NOTE — ED NOTES
Pt ambulates to triage with c/c left facial swelling- pt had wisdom teeth removed on Tuesday, increased swelling this AM.  A&ox4.  Pt to lobby & advised to inform RN of any changes.

## 2019-06-25 ENCOUNTER — PATIENT OUTREACH (OUTPATIENT)
Dept: HEALTH INFORMATION MANAGEMENT | Facility: OTHER | Age: 24
End: 2019-06-25

## 2019-06-25 LAB
ANION GAP SERPL CALC-SCNC: 10 MMOL/L (ref 0–11.9)
BASOPHILS # BLD AUTO: 0.3 % (ref 0–1.8)
BASOPHILS # BLD: 0.05 K/UL (ref 0–0.12)
BUN SERPL-MCNC: 9 MG/DL (ref 8–22)
CALCIUM SERPL-MCNC: 9.4 MG/DL (ref 8.5–10.5)
CHLORIDE SERPL-SCNC: 102 MMOL/L (ref 96–112)
CO2 SERPL-SCNC: 25 MMOL/L (ref 20–33)
CREAT SERPL-MCNC: 0.67 MG/DL (ref 0.5–1.4)
EOSINOPHIL # BLD AUTO: 0.24 K/UL (ref 0–0.51)
EOSINOPHIL NFR BLD: 1.3 % (ref 0–6.9)
ERYTHROCYTE [DISTWIDTH] IN BLOOD BY AUTOMATED COUNT: 44.2 FL (ref 35.9–50)
GLUCOSE SERPL-MCNC: 93 MG/DL (ref 65–99)
GRAM STN SPEC: NORMAL
HCT VFR BLD AUTO: 45.7 % (ref 42–52)
HGB BLD-MCNC: 14.8 G/DL (ref 14–18)
IMM GRANULOCYTES # BLD AUTO: 0.21 K/UL (ref 0–0.11)
IMM GRANULOCYTES NFR BLD AUTO: 1.1 % (ref 0–0.9)
LYMPHOCYTES # BLD AUTO: 2.84 K/UL (ref 1–4.8)
LYMPHOCYTES NFR BLD: 14.9 % (ref 22–41)
MCH RBC QN AUTO: 30.8 PG (ref 27–33)
MCHC RBC AUTO-ENTMCNC: 32.4 G/DL (ref 33.7–35.3)
MCV RBC AUTO: 95.2 FL (ref 81.4–97.8)
MONOCYTES # BLD AUTO: 2.01 K/UL (ref 0–0.85)
MONOCYTES NFR BLD AUTO: 10.6 % (ref 0–13.4)
MRSA DNA SPEC QL NAA+PROBE: NORMAL
NEUTROPHILS # BLD AUTO: 13.68 K/UL (ref 1.82–7.42)
NEUTROPHILS NFR BLD: 71.8 % (ref 44–72)
NRBC # BLD AUTO: 0 K/UL
NRBC BLD-RTO: 0 /100 WBC
PLATELET # BLD AUTO: 370 K/UL (ref 164–446)
PMV BLD AUTO: 10 FL (ref 9–12.9)
POTASSIUM SERPL-SCNC: 4 MMOL/L (ref 3.6–5.5)
RBC # BLD AUTO: 4.8 M/UL (ref 4.7–6.1)
SIGNIFICANT IND 70042: NORMAL
SIGNIFICANT IND 70042: NORMAL
SITE SITE: NORMAL
SITE SITE: NORMAL
SODIUM SERPL-SCNC: 137 MMOL/L (ref 135–145)
SOURCE SOURCE: NORMAL
SOURCE SOURCE: NORMAL
WBC # BLD AUTO: 19 K/UL (ref 4.8–10.8)

## 2019-06-25 PROCEDURE — 700101 HCHG RX REV CODE 250: Performed by: HOSPITALIST

## 2019-06-25 PROCEDURE — 700111 HCHG RX REV CODE 636 W/ 250 OVERRIDE (IP): Performed by: HOSPITALIST

## 2019-06-25 PROCEDURE — 700102 HCHG RX REV CODE 250 W/ 637 OVERRIDE(OP): Performed by: INTERNAL MEDICINE

## 2019-06-25 PROCEDURE — 770006 HCHG ROOM/CARE - MED/SURG/GYN SEMI*

## 2019-06-25 PROCEDURE — 85025 COMPLETE CBC W/AUTO DIFF WBC: CPT

## 2019-06-25 PROCEDURE — 700105 HCHG RX REV CODE 258: Performed by: INTERNAL MEDICINE

## 2019-06-25 PROCEDURE — 80048 BASIC METABOLIC PNL TOTAL CA: CPT

## 2019-06-25 PROCEDURE — A9270 NON-COVERED ITEM OR SERVICE: HCPCS | Performed by: INTERNAL MEDICINE

## 2019-06-25 PROCEDURE — 700105 HCHG RX REV CODE 258: Performed by: HOSPITALIST

## 2019-06-25 PROCEDURE — 36415 COLL VENOUS BLD VENIPUNCTURE: CPT

## 2019-06-25 PROCEDURE — 700111 HCHG RX REV CODE 636 W/ 250 OVERRIDE (IP): Performed by: INTERNAL MEDICINE

## 2019-06-25 PROCEDURE — 99232 SBSQ HOSP IP/OBS MODERATE 35: CPT | Performed by: HOSPITALIST

## 2019-06-25 PROCEDURE — 87641 MR-STAPH DNA AMP PROBE: CPT

## 2019-06-25 RX ORDER — CLINDAMYCIN HYDROCHLORIDE 150 MG/1
300 CAPSULE ORAL EVERY 6 HOURS
Status: DISCONTINUED | OUTPATIENT
Start: 2019-06-26 | End: 2019-06-26 | Stop reason: HOSPADM

## 2019-06-25 RX ORDER — CLINDAMYCIN PHOSPHATE 600 MG/50ML
600 INJECTION, SOLUTION INTRAVENOUS EVERY 8 HOURS
Status: COMPLETED | OUTPATIENT
Start: 2019-06-25 | End: 2019-06-25

## 2019-06-25 RX ADMIN — SENNOSIDES, DOCUSATE SODIUM 2 TABLET: 50; 8.6 TABLET, FILM COATED ORAL at 16:47

## 2019-06-25 RX ADMIN — AMPICILLIN SODIUM AND SULBACTAM SODIUM 3 G: 2; 1 INJECTION, POWDER, FOR SOLUTION INTRAMUSCULAR; INTRAVENOUS at 05:02

## 2019-06-25 RX ADMIN — ACETAMINOPHEN 650 MG: 325 TABLET, FILM COATED ORAL at 20:37

## 2019-06-25 RX ADMIN — CLINDAMYCIN IN 5 PERCENT DEXTROSE 600 MG: 12 INJECTION, SOLUTION INTRAVENOUS at 22:11

## 2019-06-25 RX ADMIN — SENNOSIDES, DOCUSATE SODIUM 2 TABLET: 50; 8.6 TABLET, FILM COATED ORAL at 05:02

## 2019-06-25 RX ADMIN — VANCOMYCIN HYDROCHLORIDE 1300 MG: 100 INJECTION, POWDER, LYOPHILIZED, FOR SOLUTION INTRAVENOUS at 16:04

## 2019-06-25 RX ADMIN — OXYCODONE HYDROCHLORIDE 5 MG: 5 TABLET ORAL at 08:18

## 2019-06-25 RX ADMIN — SODIUM CHLORIDE: 9 INJECTION, SOLUTION INTRAVENOUS at 18:54

## 2019-06-25 RX ADMIN — OXYCODONE HYDROCHLORIDE 10 MG: 10 TABLET ORAL at 14:12

## 2019-06-25 RX ADMIN — AMPICILLIN SODIUM AND SULBACTAM SODIUM 3 G: 2; 1 INJECTION, POWDER, FOR SOLUTION INTRAMUSCULAR; INTRAVENOUS at 16:47

## 2019-06-25 RX ADMIN — CLINDAMYCIN IN 5 PERCENT DEXTROSE 600 MG: 12 INJECTION, SOLUTION INTRAVENOUS at 17:23

## 2019-06-25 RX ADMIN — AMPICILLIN SODIUM AND SULBACTAM SODIUM 3 G: 2; 1 INJECTION, POWDER, FOR SOLUTION INTRAMUSCULAR; INTRAVENOUS at 12:35

## 2019-06-25 RX ADMIN — OXYCODONE HYDROCHLORIDE 10 MG: 10 TABLET ORAL at 05:01

## 2019-06-25 RX ADMIN — OXYCODONE HYDROCHLORIDE 10 MG: 10 TABLET ORAL at 11:27

## 2019-06-25 RX ADMIN — OXYCODONE HYDROCHLORIDE 10 MG: 10 TABLET ORAL at 18:54

## 2019-06-25 RX ADMIN — VANCOMYCIN HYDROCHLORIDE 1300 MG: 100 INJECTION, POWDER, LYOPHILIZED, FOR SOLUTION INTRAVENOUS at 08:11

## 2019-06-25 ASSESSMENT — ENCOUNTER SYMPTOMS
SPUTUM PRODUCTION: 0
HALLUCINATIONS: 0
STRIDOR: 0
NERVOUS/ANXIOUS: 0
MYALGIAS: 0
FEVER: 0
EYE PAIN: 0
BLOOD IN STOOL: 0
LOSS OF CONSCIOUSNESS: 0
SORE THROAT: 0
SEIZURES: 0
SPEECH CHANGE: 0
ABDOMINAL PAIN: 0
FOCAL WEAKNESS: 0
VOMITING: 0
FLANK PAIN: 0
HEMOPTYSIS: 0
EYE DISCHARGE: 0
SHORTNESS OF BREATH: 0
CHILLS: 0
EYE REDNESS: 0
PALPITATIONS: 0
COUGH: 0
BRUISES/BLEEDS EASILY: 0
DIARRHEA: 0

## 2019-06-25 NOTE — DISCHARGE INSTRUCTIONS
Discharge Instructions    Discharged to home by car with relative. Discharged via walking, hospital escort: Refused.  Special equipment needed: Not Applicable    Be sure to schedule a follow-up appointment with your primary care doctor or any specialists as instructed.     Discharge Plan:   Diet Plan: Discussed  Activity Level: Discussed  Confirmed Follow up Appointment: Patient to Call and Schedule Appointment  Confirmed Symptoms Management: Discussed  Medication Reconciliation Updated: Yes  Influenza Vaccine Indication: Patient Refuses    I understand that a diet low in cholesterol, fat, and sodium is recommended for good health. Unless I have been given specific instructions below for another diet, I accept this instruction as my diet prescription.   Other diet: Regular as tolerated    Special Instructions: None    · Is patient discharged on Warfarin / Coumadin?   No     Depression / Suicide Risk    As you are discharged from this [facility] facility, it is important to learn how to keep safe from harming yourself.    Recognize the warning signs:  · Abrupt changes in personality, positive or negative- including increase in energy   · Giving away possessions  · Change in eating patterns- significant weight changes-  positive or negative  · Change in sleeping patterns- unable to sleep or sleeping all the time   · Unwillingness or inability to communicate  · Depression  · Unusual sadness, discouragement and loneliness  · Talk of wanting to die  · Neglect of personal appearance   · Rebelliousness- reckless behavior  · Withdrawal from people/activities they love  · Confusion- inability to concentrate     If you or a loved one observes any of these behaviors or has concerns about self-harm, here's what you can do:  · Talk about it- your feelings and reasons for harming yourself  · Remove any means that you might use to hurt yourself (examples: pills, rope, extension cords, firearm)  · Get professional help from the  community (Mental Health, Substance Abuse, psychological counseling)  · Do not be alone:Call your Safe Contact- someone whom you trust who will be there for you.  · Call your local CRISIS HOTLINE 724-3306 or 726-388-2112  · Call your local Children's Mobile Crisis Response Team [place] (321) 609-7367 or www.Sabesim  · Call the toll free National Suicide Prevention Hotlines   · National Suicide Prevention Lifeline 046-905-UVYJ (7705)  · [employer] Hope Line Network 800-SUICIDE (552-5871)        Dental Abscess  A dental abscess is a collection of pus in or around a tooth.  What are the causes?  This condition is caused by a bacterial infection around the root of the tooth that involves the inner part of the tooth (pulp). It may result from:  · Severe tooth decay.  · Trauma to the tooth that allows bacteria to enter into the pulp, such as a broken or chipped tooth.  · Severe gum disease around a tooth.  What are the signs or symptoms?  Symptoms of this condition include:  · Severe pain in and around the infected tooth.  · Swelling and redness around the infected tooth, in the mouth, or in the face.  · Tenderness.  · Pus drainage.  · Bad breath.  · Bitter taste in the mouth.  · Difficulty swallowing.  · Difficulty opening the mouth.  · Nausea.  · Vomiting.  · Chills.  · Swollen neck glands.  · Fever.  How is this diagnosed?  This condition is diagnosed with examination of the infected tooth. During the exam, your dentist may tap on the infected tooth. Your dentist will also ask about your medical and dental history and may order X-rays.  How is this treated?  This condition is treated by eliminating the infection. This may be done with:  · Antibiotic medicine.  · A root canal. This may be performed to save the tooth.  · Pulling (extracting) the tooth. This may also involve draining the abscess. This is done if the tooth cannot be saved.  Follow these instructions at home:  · Take medicines only as directed  by your dentist.  · If you were prescribed antibiotic medicine, finish all of it even if you start to feel better.  · Rinse your mouth (gargle) often with salt water to relieve pain or swelling.  · Do not drive or operate heavy machinery while taking pain medicine.  · Do not apply heat to the outside of your mouth.  · Keep all follow-up visits as directed by your dentist. This is important.  Contact a health care provider if:  · Your pain is worse and is not helped by medicine.  Get help right away if:  · You have a fever or chills.  · Your symptoms suddenly get worse.  · You have a very bad headache.  · You have problems breathing or swallowing.  · You have trouble opening your mouth.  · You have swelling in your neck or around your eye.  This information is not intended to replace advice given to you by your health care provider. Make sure you discuss any questions you have with your health care provider.  Document Released: 12/18/2006 Document Revised: 04/27/2017 Document Reviewed: 12/15/2015  [employer] Interactive Patient Education © 2017 [employer]        Antibiotic Medicine  Antibiotic medicines are used to treat infections caused by bacteria. They work by injuring or killing the bacteria that is making you sick.  HOW IS AN ANTIBIOTIC CHOSEN?  An antibiotic is chosen based on many factors. To help your health care provider choose one for you, tell your health care provider if:  · You have any allergies.  · You are pregnant or plan to get pregnant.  · You are breastfeeding.  · You are taking any medicines. These include over-the-counter medicines, prescription medicines, and herbal remedies.  · You have a medical condition or problem you have not already discussed.  Your health care provider will also consider:  · How often the medicine has to be taken.  · Common side effects of the medicine.  · The cost of the medicine.  · The taste of the medicine.  If you have questions about why an antibiotic was chosen,  make sure to ask.  FOR HOW LONG SHOULD I TAKE MY ANTIBIOTIC?  Continue to take your antibiotic for as long as told by your health care provider. Do not stop taking it when you feel better. If you stop taking it too soon:  · You may start to feel sick again.  · Your infection may become harder to treat.  · Complications may develop.  WHAT IF I MISS A DOSE?  Try not to miss any doses of medicine. If you miss a dose, take it as soon as possible. However, if it is almost time for the next dose:  · If you are taking 2 doses per day, take the missed dose and the next dose 5 to 6 hours apart.  · If you are taking 3 or more doses per day, take the missed dose and the next dose 2 to 4 hours apart, then go back to the normal schedule.  If you cannot make up a missed dose, take the next scheduled dose on time. Then take the missed dose after you have taken all the doses as recommended by your health care provider, as if you had one more dose left.  DO ANTIBIOTICS AFFECT BIRTH CONTROL?  Birth control pills may not work while you are on antibiotics. If you are taking birth control pills, continue taking them as usual and use a second form of birth control, such as a condom, to avoid unwanted pregnancy. Continue using the second form of birth control until you are finished with your current 1 month cycle of birth control pills.  OTHER INFORMATION  · If there is any medicine left over, throw it away.  · Never take someone else's antibiotics.  · Never take leftover antibiotics.  SEEK MEDICAL CARE IF:  · You get worse.  · You do not feel better within a few days of starting the antibiotic medicine.  · You vomit.  · White patches appear in your mouth.  · You have new joint pain that begins after starting the antibiotic.  · You have new muscle aches that begin after starting the antibiotic.  · You had a fever before starting the antibiotic and it returns.  · You have any symptoms of an allergic reaction, such as an itchy rash. If this  happens, stop taking the antibiotic.  SEEK IMMEDIATE MEDICAL CARE IF:  · Your urine turns dark or becomes blood-colored.  · Your skin turns yellow.  · You bruise or bleed easily.  · You have severe diarrhea and abdominal cramps.  · You have a severe headache.  · You have signs of a severe allergic reaction, such as:  ¨ Trouble breathing.  ¨ Wheezing.  ¨ Swelling of the lips, tongue, or face.  ¨ Fainting.  ¨ Blisters on the skin or in the mouth.  If you have signs of a severe allergic reaction, stop taking the antibiotic right away.  This information is not intended to replace advice given to you by your health care provider. Make sure you discuss any questions you have with your health care provider.  Document Released: 08/30/2005 Document Revised: 09/07/2016 Document Reviewed: 05/04/2016  [employer] Interactive Patient Education © 2017 [employer]

## 2019-06-25 NOTE — PROGRESS NOTES
Report received from ER RN  Assumed care of patient at 1750.    Pt is A&O x 4.  Pain reported at 8/10. Medicated per MAR  Nausea denied  NPO   Swelling a redness noted to lt cheek/jaw. Ice pack in place.    Family and bf updated on POC by pt.   Bed in lowest position and locked.  Pt resting comfortably now.  Review plan of care with patient  Call light within reach  Hourly rounds in place  All needs met at this time

## 2019-06-25 NOTE — ANESTHESIA PROCEDURE NOTES
Airway  Date/Time: 6/24/2019 7:23 PM  Performed by: JOSSY MCMILLAN  Authorized by: JOSSY MCMILLAN     Location:  OR  Urgency:  Elective  Indications for Airway Management:  Anesthesia  Spontaneous Ventilation: absent    Sedation Level:  Deep  Preoxygenated: Yes    Patient Position:  Sniffing  Final Airway Type:  Endotracheal airway  Final Endotracheal Airway:  ETT  Cuffed: Yes    Technique Used for Successful ETT Placement:  Direct laryngoscopy  Insertion Site:  Oral  Blade Type:  Butch  Laryngoscope Blade/Videolaryngoscope Blade Size:  4  ETT Size (mm):  7.5  Measured from:  Teeth  ETT to Teeth (cm):  25  Placement Verified by: auscultation and capnometry    Cormack-Lehane Classification:  Grade I - full view of glottis  Number of Attempts at Approach:  1

## 2019-06-25 NOTE — ASSESSMENT & PLAN NOTE
S/p teeth #17 extraction  Significant abscess and cellulitis  On IV vancomycin and Unasyn, pending cultures

## 2019-06-25 NOTE — PROGRESS NOTES
"Pharmacy Kinetics 23 y.o. male on vancomycin day # 2 2019    Currently on Vancomycin 1300 mg iv q8hr (00,08,16)    Indication for Treatment: Facial abscess after removal of wisdom teeth 3 days prior to presentation    Pertinent history per medical record: Admitted on 2019 for worsening facial swelling after removal of wisdom teeth 3 days prior to presentation. His dentist was unavailable for follow up and he noted light pink discharge from surgical incision; along with pain and dysphagia. CT states possible superinfection/abscess inferior to L mandibular third molar extraction socket with a medial cortex slight fracture.    Other antibiotics:   Ampicillin/sulbactam 3g iv q6hrs    Allergies: Patient has no allergy information on record.     List concerns for renal function: Contrast     Pertinent cultures to date:    - Left neck abscess - Prelim GPCs, GPRs, and GNRs on GS    MRSA nares swab if pneumonia is a concern (ordered/positive/negative/n-a): N/A    Recent Labs      19   1355  19   0357   WBC  23.0*  19.0*   NEUTSPOLYS  76.40*  71.80     Recent Labs      19   1355  19   0357   BUN  9  9   CREATININE  0.84  0.67   ALBUMIN  4.2   --      Intake/Output Summary (Last 24 hours) at 19 0846  Last data filed at 19 0800   Gross per 24 hour   Intake          2384.45 ml   Output                5 ml   Net          2379.45 ml      /79   Pulse 93   Temp 37.1 °C (98.8 °F) (Temporal)   Resp 20   Ht 1.753 m (5' 9\")   Wt 85.4 kg (188 lb 4.4 oz)   SpO2 95%  Temp (24hrs), Av.8 °C (98.3 °F), Min:36.1 °C (96.9 °F), Max:37.9 °C (100.3 °F)      A/P   1. Vancomycin dose change: No change  2. Next vancomycin level: 19 @ 07  3. Goal trough: 12-16 mcg/mL  4. Comments: Urine output per documentation is inadequate. Patient has minimal risk factors for supratherapeutic trough. Will order trough tomorrow at 07 to assess level and adjust as necessary. Pharmacy will " continue to monitor daily.    Lesley Piedra, JaradD

## 2019-06-25 NOTE — CARE PLAN
Problem: Infection  Goal: Will remain free from infection  Outcome: PROGRESSING AS EXPECTED  Hand hygiene and oral care education provided    Problem: Pain Management  Goal: Pain level will decrease to patient's comfort goal  Outcome: PROGRESSING AS EXPECTED  Pain is well controlled with ice packs

## 2019-06-25 NOTE — OP REPORT
DATE OF SERVICE:  06/24/2019    PREOPERATIVE DIAGNOSES:  Severe left submandibular space abscess.    POSTOPERATIVE DIAGNOSES:  Severe left submandibular space abscess.    PROCEDURES:  Incision and drainage of the left neck abscess and debridement of   extraction site #17.    INDICATIONS:  This 23-year-old male patient had his wisdom teeth removed by   general dentist several days ago.  He has developed a severe postoperative   infection and was sent to the emergency room for treatment by an oral surgeon.    The patient's infection was large enough that it needed to be drained in the   operating room under general anesthetic.  The risks and benefits of this type   of procedure were explained to the patient in detail prior to going to the   operating room and consent was obtained.    OPERATIVE NOTE:  The patient was brought to the main operating room at [facility].  A general anesthetic was induced.  The patient was intubated   without difficulty.  He was then prepped and draped in the typical manner for   an oral surgery procedure.  Approximately 7 mL of 0.5% Marcaine with   epinephrine was infiltrated around the left neck and lower left inferior   alveolar nerve.  Attention was first directed to the left neck.  About 1-inch   incision was made below the inferior border of the mandible in the left neck.    A hemostat was used to open up the abscess.  A large amount of pus was   expressed.  Aerobic and anaerobic cultures were taken.  Next using blunt   dissection with my finger, we opened up the left submandibular space and   irrigated it with saline.    We then went inside the mouth and reopened the #17 socket.  It appeared to   have some sort of necrotic debris deep in the socket.  This was curetted out.    There were multiple sharp edges of bone around the extraction site.  This   bone was filed down with a bone file.  The extraction site was left open and   flushed again with saline.  At this point in  time, we went back to the left   neck.  Two 0.25 inch Penrose drains were sutured into the lower left   submandibular space, one more posterior and one more anterior.  These were   sutured into place with interrupted 2-0 silk sutures.  At this point in time,   the procedure was complete.  The moistened throat pack was removed from the   back of the throat.  A dressing was placed on the left neck.  The patient was   awakened from his general anesthetic, having tolerated the procedure well.    DISPOSITION:  To postoperative recovery in stable and satisfactory condition.    He is currently admitted to the hospitalist.  He will most likely need 1-2   days of IV antibiotics as this is a pretty severe infection.  The drains will   probably be left in place for 5-7 days.  Most likely, he will be discharged   home with the Penrose drains in place.  Please contact me with any questions.    My office phone number is #[phone].    ESTIMATED BLOOD LOSS:  None.    SPECIMENS:  Aerobic and anaerobic cultures.       ____________________________________     MILTON HARMON MD,DDS    DAYNA / MARIZA    DD:  06/24/2019 19:44:14  DT:  06/24/2019 20:43:07    D#:  [id number]  Job#:  [id number]

## 2019-06-25 NOTE — PROGRESS NOTES
"Received report from PAC-U.  Pt arrived to T432 bed 2 via gurney  No immediate distress.  A&Ox4  Denies pain  Denies n/v  Two penrose drains to left side of neck   Draining serosanguinous    Patient currently running vanco from earlier.     Patient states, \"I feel really hot,\"  Patient appears to be flushed in the face and body   Possible reaction to Vanco  Updated hospitalist slowed down the rate of infusion.         Oriented to room, educated on welcome packet, white board, TV, call light, call before fall, plan of care, oral care expectations, ambulation goals, and up to chair for all meals goal.  Call light and personal belongings within reach.  Fall precautions and hourly rounding in place  "

## 2019-06-25 NOTE — PROGRESS NOTES
Assumed care of patient. Patient stating pain to left jaw/neck is a 7/10. Medicated per MAR. Left cheek and under jaw is swollen. Cheeks are red. Kerlix and gauze dressing noted to left neck. No drainage noted at this time. IV antibiotics administered. No other needs at this time.

## 2019-06-25 NOTE — ANESTHESIA PREPROCEDURE EVALUATION
Relevant Problems   No relevant active problems       Physical Exam    Airway   Mallampati: II  TM distance: >3 FB  Neck ROM: full       Cardiovascular - normal exam  Rhythm: regular  Rate: normal  (-) murmur     Dental - normal exam         Pulmonary - normal exam  Breath sounds clear to auscultation     Abdominal    Neurological - normal exam                 Anesthesia Plan    ASA 2 - emergent   ASA physical status emergent criteria: acute deteriorating condition due to infection    Plan - general       Airway plan will be ETT        Induction: intravenous    Postoperative Plan: Postoperative administration of opioids is intended.    Pertinent diagnostic labs and testing reviewed    Informed Consent:    Anesthetic plan and risks discussed with patient.    Use of blood products discussed with: patient whom consented to blood products.

## 2019-06-25 NOTE — ANESTHESIA QCDR
2019 [facility] Clinical Data Registry (for Quality Improvement)     Postoperative nausea/vomiting risk protocol (Adult = 18 yrs and Pediatric 3-17 yrs)- (430 and 463)  General inhalation anesthetic (NOT TIVA) with PONV risk factors: Yes  Provision of anti-emetic therapy with at least 2 different classes of agents: Yes   Patient DID NOT receive anti-emetic therapy and reason is documented in Medical Record:  N/A    Multimodal Pain Management- (AQI59)  Patient undergoing Elective Surgery (i.e. Outpatient, or ASC, or Prescheduled Surgery prior to Hospital Admission): Yes  Use of Multimodal Pain Management, two or more drugs and/or interventions, NOT including systemic opioids: Yes   Exception: Documented allergy to multiple classes of analgesics:  N/A    PACU assessment of acute postoperative pain prior to Anesthesia Care End- Applies to Patients Age = 18- (ABG7)  Initial PACU pain score is which of the following: < 7/10  Patient unable to report pain score: N/A    Post-anesthetic transfer of care checklist/protocol to PACU/ICU- (426 and 427)  Upon conclusion of case, patient transferred to which of the following locations: PACU/Non-ICU  Use of transfer checklist/protocol: Yes  Exclusion: Service Performed in Patient Hospital Room (and thus did not require transfer): N/A    PACU Reintubation- (AQI31)  General anesthesia requiring endotracheal intubation (ETT) along with subsequent extubation in OR or PACU: No  Required reintubation in the PACU: N/A  Extubation was a planned trial documented in the medical record prior to removal of the original airway device: N/A    Unplanned admission to ICU related to anesthesia service up through end of PACU care- (MD51)  Unplanned admission to ICU (not initially anticipated at anesthesia start time): No

## 2019-06-25 NOTE — DIETARY
Nutrition Services:    Poor PO intake on Nutrition Admit Screen. Pt is currently on a FLD diet with 50-75% PO intake at meals; he states appetite has improved today greatly. Ht: 69 inches, Wt: 85.4 kg, BMI 27.8, overweight category.  No nutritional interventions currently indicated.  ADAT.     Consult RD as needed. RD will re-screen weekly.

## 2019-06-25 NOTE — ANESTHESIA TIME REPORT
Anesthesia Start and Stop Event Times     Date Time Event    6/24/2019 1850 Anesthesia Start     1951 Anesthesia Stop        Responsible Staff  06/24/19    Name Role Begin End    Ken Johnson M.D. Anesth 1850 1951        Preop Diagnosis (Free Text):  Pre-op Diagnosis     left  neck abscess        Preop Diagnosis (Codes):  Diagnosis Information     Diagnosis Code(s):         Post op Diagnosis  Neck abscess      Premium Reason  B. 1st Call    Comments:

## 2019-06-25 NOTE — CARE PLAN
Problem: Knowledge Deficit  Goal: Knowledge of disease process/condition, treatment plan, diagnostic tests, and medications will improve  Outcome: PROGRESSING AS EXPECTED    Intervention: Explain information regarding disease process/condition, treatment plan, diagnostic tests, and medications and document in education  Pt was educated on POC, MAR, shift routine and nursing education R/T Dx. Questions were encouraged and answered. Pt was educated on plan for surgery. Pt verbalized understanding of all teaching.           Problem: Pain Management  Goal: Pain level will decrease to patient's comfort goal  Outcome: PROGRESSING AS EXPECTED    Intervention: Follow pain managment plan developed in collaboration with patient and Interdisciplinary Team  Pt was educated on 0-10 pain scale, non-pharm methods of pain relief and MAR. Pt demonstrates understanding by rating pain as a 8 on 0-10 pain scale. Pt was medicated per MAR and provided with ice packs.

## 2019-06-25 NOTE — PROGRESS NOTES
Hospital Medicine Daily Progress Note    Date of Service  6/25/2019    Chief Complaint  Left facial swelling and pain    Hospital Course      23 y.o. male with past medical history of tobacco use admitted 6/24/2019 with Left facial swelling and pain found to have a dental abscess.  Oral surgery have been consulted, the patient underwent incision and drainage of the left neck abscess and debridement and extraction of tooth site #17.        Interval Problem Update  Afebrile overnight.  Heart rate 80s-90s and blood pressure within normal limits this morning.  Saturating well on room air.  Marked swelling of the left side of the face and neck.  Pain with opening his mouth and with the eating, improving slowly.  Blood and wound cultures no growth to date  Significant leukocytosis 23>19  Shortness of breath improving slowly.  Continue to monitor closely.  Discussed with patient, and patient's nurse     Consultants/Specialty  Oral surgery    Code Status  FULL     Disposition  Likely home tomorrow    Review of Systems  Review of Systems   Constitutional: Negative for chills and fever.   HENT: Negative for congestion and sore throat.         Left side facial and neck swelling and pain   Eyes: Negative for pain, discharge and redness.   Respiratory: Negative for cough, hemoptysis, sputum production, shortness of breath and stridor.    Cardiovascular: Negative for chest pain, palpitations and leg swelling.   Gastrointestinal: Negative for abdominal pain, blood in stool, diarrhea and vomiting.   Genitourinary: Negative for flank pain, hematuria and urgency.   Musculoskeletal: Negative for myalgias.   Skin: Negative.    Neurological: Negative for speech change, focal weakness, seizures and loss of consciousness.   Endo/Heme/Allergies: Does not bruise/bleed easily.   Psychiatric/Behavioral: Negative for hallucinations and suicidal ideas. The patient is not nervous/anxious.         Physical Exam  Temp:  [36.1 °C (96.9 °F)-37.9 °C  (100.3 °F)] 37.6 °C (99.6 °F)  Pulse:  [77-96] 90  Resp:  [16-25] 16  BP: (116-135)/(68-82) 135/80  SpO2:  [91 %-100 %] 94 %    Physical Exam   Constitutional: He is oriented to person, place, and time. He appears well-developed and well-nourished.   HENT:   Head: Normocephalic and atraumatic.   Right Ear: External ear normal.   Left Ear: External ear normal.   Eyes: Pupils are equal, round, and reactive to light. Right eye exhibits no discharge. Left eye exhibits no discharge. No scleral icterus.   Neck: Normal range of motion. Neck supple. No JVD present. No tracheal deviation present. No thyromegaly present.   Left side facial and neck swelling    Cardiovascular: Normal rate, regular rhythm and normal heart sounds.  Exam reveals no friction rub.    No murmur heard.  Pulmonary/Chest: Effort normal. No stridor. No respiratory distress. He has no wheezes. He has no rales.   Abdominal: Soft. He exhibits no distension. There is no tenderness. There is no rebound.   Musculoskeletal: He exhibits no edema, tenderness or deformity.   Neurological: He is alert and oriented to person, place, and time. No cranial nerve deficit. Coordination normal.   Skin: Skin is warm and dry.   Psychiatric: He has a normal mood and affect. His behavior is normal. Judgment normal.   Nursing note and vitals reviewed.      Fluids    Intake/Output Summary (Last 24 hours) at 06/25/19 1429  Last data filed at 06/25/19 0800   Gross per 24 hour   Intake          2384.45 ml   Output                5 ml   Net          2379.45 ml       Laboratory  Recent Labs      06/24/19   1355  06/25/19   0357   WBC  23.0*  19.0*   RBC  5.24  4.80   HEMOGLOBIN  16.6  14.8   HEMATOCRIT  49.5  45.7   MCV  94.5  95.2   MCH  31.7  30.8   MCHC  33.5*  32.4*   RDW  43.4  44.2   PLATELETCT  420  370   MPV  9.8  10.0     Recent Labs      06/24/19   1355  06/25/19   0357   SODIUM  142  137   POTASSIUM  3.9  4.0   CHLORIDE  104  102   CO2  27  25   GLUCOSE  104*  93   BUN   9  9   CREATININE  0.84  0.67   CALCIUM  9.6  9.4                   Imaging  [id number]   Final Result      Status post left lower wisdom tooth extraction.      CT-SOFT TISSUE NECK WITH   Final Result      4 cm abscess centered deep and inferior to the left mandibular third molar extraction socket where there is medial cortex slight fracture      Small retropharyngeal fluid could be reactive. This superinfection/abscess is not excluded      Moderate mass effect upon the giuseppe and hypopharyngeal airway from the left inflammatory/phlegmonous change.      Overlying cellulitis      Findings were discussed with WILFREDO MARADIAGA on 6/24/2019 3:20 PM.         Assessment/Plan  * Dental abscess- (present on admission)   Assessment & Plan    S/p teeth #17 extraction  Significant abscess and cellulitis  On IV vancomycin and Unasyn, pending cultures      Dyspnea   Assessment & Plan    Likely due to mass-effect  Improving slowly  Saturating well so far, closely monitor for respiratory depression      Hyperglycemia   Assessment & Plan    Likely reactive  Monitor glucose, if it does worsen he may need insulin sliding scale     Tobacco abuse- (present on admission)   Assessment & Plan    The patient was counseled by the admitting provider      Leukocytosis- (present on admission)   Assessment & Plan    Profound elevation due to abscess  Improving with intravenous fluids and antibiotics           VTE prophylaxis: SCDs, ambulatory

## 2019-06-25 NOTE — ANESTHESIA POSTPROCEDURE EVALUATION
Patient: Gray Sandhu    Procedure Summary     Date:  06/24/19 Room / Location:  [facility] OR 09 / SURGERY [facility]    Anesthesia Start:  1850 Anesthesia Stop:  1941    Procedure:  INCISION AND DRAINAGE, LEFT NECK  ABSCESS, (Left Neck) Diagnosis:  (left  neck abscess)    Surgeon:  Julian Rivera M.D. Responsible Provider:  Ken Johnson M.D.    Anesthesia Type:  general ASA Status:  2 - Emergent          Final Anesthesia Type: general  Last vitals  BP   Blood Pressure: 118/82, NIBP: 125/77    Temp   37.3 °C (99.1 °F)    Pulse   Pulse: 96, Heart Rate (Monitored): 88   Resp   16    SpO2   95 %      Anesthesia Post Evaluation    Patient location during evaluation: PACU  Patient participation: complete - patient participated  Level of consciousness: awake and alert    Airway patency: patent  Anesthetic complications: no  Cardiovascular status: hemodynamically stable  Respiratory status: acceptable  Hydration status: euvolemic    PONV: none           Nurse Pain Score: 8 (NPRS)

## 2019-06-25 NOTE — ASSESSMENT & PLAN NOTE
Likely due to mass-effect  Improving slowly  Saturating well so far, closely monitor for respiratory depression

## 2019-06-25 NOTE — OR NURSING
Pt on room air.  No c/o nausea, tolerating PO fluids and medication.  No difficulty swallowing.  Left lateral neck dressing intact, penrose drain sutured in place with gauze taped over to absorb drainage.  Pillow case placed on left shoulder to catch any additional drainage.  4X4 gauze rolled and placed in left side of mouth for absorption of oral secretions/drainage.   Pain tolerable at this time per pt.  VSS, afebrile, ARTEAGA, A/O x4.  Vancomycin IV completing per MAR.     Only belongings in Recovery are jewelry; nose ring, 3 right earrings, 1 left earring.

## 2019-06-25 NOTE — ANESTHESIA POSTPROCEDURE EVALUATION
Patient: Gray Sandhu    Procedure Summary     Date:  06/24/19 Room / Location:  [facility] OR 09 / SURGERY [facility]    Anesthesia Start:  1850 Anesthesia Stop:  1941    Procedure:  INCISION AND DRAINAGE, LEFT NECK  ABSCESS, (Left Neck) Diagnosis:  (left  neck abscess)    Surgeon:  Julian Rivera M.D. Responsible Provider:  Ken Johnson M.D.    Anesthesia Type:  general ASA Status:  2 - Emergent          Final Anesthesia Type: general  Last vitals  BP   Blood Pressure: 118/82, NIBP: 125/77    Temp   37.3 °C (99.1 °F)    Pulse   Pulse: 96, Heart Rate (Monitored): 88   Resp   16    SpO2   95 %      Anesthesia Post Evaluation    Patient location during evaluation: PACU  Patient participation: complete - patient participated  Level of consciousness: awake and alert    Airway patency: patent  Anesthetic complications: no  Cardiovascular status: hemodynamically stable  Respiratory status: acceptable  Hydration status: euvolemic    PONV: none           Nurse Pain Score: 8 (NPRS)

## 2019-06-26 VITALS
DIASTOLIC BLOOD PRESSURE: 69 MMHG | HEIGHT: 69 IN | OXYGEN SATURATION: 93 % | BODY MASS INDEX: 27.89 KG/M2 | RESPIRATION RATE: 18 BRPM | HEART RATE: 70 BPM | SYSTOLIC BLOOD PRESSURE: 122 MMHG | TEMPERATURE: 97.6 F | WEIGHT: 188.27 LBS

## 2019-06-26 PROBLEM — D72.829 LEUKOCYTOSIS: Status: RESOLVED | Noted: 2019-06-24 | Resolved: 2019-06-26

## 2019-06-26 PROBLEM — R73.9 HYPERGLYCEMIA: Status: RESOLVED | Noted: 2019-06-24 | Resolved: 2019-06-26

## 2019-06-26 PROBLEM — R06.00 DYSPNEA: Status: RESOLVED | Noted: 2019-06-24 | Resolved: 2019-06-26

## 2019-06-26 LAB
ANION GAP SERPL CALC-SCNC: 8 MMOL/L (ref 0–11.9)
BACTERIA WND AEROBE CULT: ABNORMAL
BACTERIA WND AEROBE CULT: ABNORMAL
BASOPHILS # BLD AUTO: 0.5 % (ref 0–1.8)
BASOPHILS # BLD: 0.08 K/UL (ref 0–0.12)
BUN SERPL-MCNC: 10 MG/DL (ref 8–22)
CALCIUM SERPL-MCNC: 9 MG/DL (ref 8.5–10.5)
CHLORIDE SERPL-SCNC: 103 MMOL/L (ref 96–112)
CO2 SERPL-SCNC: 28 MMOL/L (ref 20–33)
CREAT SERPL-MCNC: 0.77 MG/DL (ref 0.5–1.4)
EOSINOPHIL # BLD AUTO: 0.31 K/UL (ref 0–0.51)
EOSINOPHIL NFR BLD: 2 % (ref 0–6.9)
ERYTHROCYTE [DISTWIDTH] IN BLOOD BY AUTOMATED COUNT: 42.8 FL (ref 35.9–50)
GLUCOSE SERPL-MCNC: 100 MG/DL (ref 65–99)
GRAM STN SPEC: ABNORMAL
HCT VFR BLD AUTO: 46.1 % (ref 42–52)
HGB BLD-MCNC: 15.1 G/DL (ref 14–18)
IMM GRANULOCYTES # BLD AUTO: 0.32 K/UL (ref 0–0.11)
IMM GRANULOCYTES NFR BLD AUTO: 2.1 % (ref 0–0.9)
LYMPHOCYTES # BLD AUTO: 2.07 K/UL (ref 1–4.8)
LYMPHOCYTES NFR BLD: 13.4 % (ref 22–41)
MCH RBC QN AUTO: 30.9 PG (ref 27–33)
MCHC RBC AUTO-ENTMCNC: 32.8 G/DL (ref 33.7–35.3)
MCV RBC AUTO: 94.3 FL (ref 81.4–97.8)
MONOCYTES # BLD AUTO: 1.99 K/UL (ref 0–0.85)
MONOCYTES NFR BLD AUTO: 12.9 % (ref 0–13.4)
NEUTROPHILS # BLD AUTO: 10.69 K/UL (ref 1.82–7.42)
NEUTROPHILS NFR BLD: 69.1 % (ref 44–72)
NRBC # BLD AUTO: 0 K/UL
NRBC BLD-RTO: 0 /100 WBC
PLATELET # BLD AUTO: 357 K/UL (ref 164–446)
PMV BLD AUTO: 9.4 FL (ref 9–12.9)
POTASSIUM SERPL-SCNC: 4.4 MMOL/L (ref 3.6–5.5)
RBC # BLD AUTO: 4.89 M/UL (ref 4.7–6.1)
SIGNIFICANT IND 70042: ABNORMAL
SITE SITE: ABNORMAL
SODIUM SERPL-SCNC: 139 MMOL/L (ref 135–145)
SOURCE SOURCE: ABNORMAL
VANCOMYCIN TROUGH SERPL-MCNC: 4.9 UG/ML (ref 10–20)
WBC # BLD AUTO: 15.5 K/UL (ref 4.8–10.8)

## 2019-06-26 PROCEDURE — 80202 ASSAY OF VANCOMYCIN: CPT

## 2019-06-26 PROCEDURE — 80048 BASIC METABOLIC PNL TOTAL CA: CPT

## 2019-06-26 PROCEDURE — 700102 HCHG RX REV CODE 250 W/ 637 OVERRIDE(OP): Performed by: HOSPITALIST

## 2019-06-26 PROCEDURE — A6212 FOAM DRG <=16 SQ IN W/BORDER: HCPCS | Performed by: HOSPITALIST

## 2019-06-26 PROCEDURE — A9270 NON-COVERED ITEM OR SERVICE: HCPCS | Performed by: HOSPITALIST

## 2019-06-26 PROCEDURE — 700102 HCHG RX REV CODE 250 W/ 637 OVERRIDE(OP): Performed by: INTERNAL MEDICINE

## 2019-06-26 PROCEDURE — 36415 COLL VENOUS BLD VENIPUNCTURE: CPT

## 2019-06-26 PROCEDURE — A9270 NON-COVERED ITEM OR SERVICE: HCPCS | Performed by: INTERNAL MEDICINE

## 2019-06-26 PROCEDURE — 700111 HCHG RX REV CODE 636 W/ 250 OVERRIDE (IP): Performed by: INTERNAL MEDICINE

## 2019-06-26 PROCEDURE — 700105 HCHG RX REV CODE 258: Performed by: INTERNAL MEDICINE

## 2019-06-26 PROCEDURE — 99239 HOSP IP/OBS DSCHRG MGMT >30: CPT | Performed by: HOSPITALIST

## 2019-06-26 PROCEDURE — 85025 COMPLETE CBC W/AUTO DIFF WBC: CPT

## 2019-06-26 RX ORDER — AMOXICILLIN AND CLAVULANATE POTASSIUM 875; 125 MG/1; MG/1
1 TABLET, FILM COATED ORAL EVERY 12 HOURS
Qty: 19 TAB | Refills: 0 | Status: SHIPPED | OUTPATIENT
Start: 2019-06-26 | End: 2019-07-06

## 2019-06-26 RX ORDER — ACETAMINOPHEN 325 MG/1
325 TABLET ORAL EVERY 6 HOURS PRN
Qty: 30 TAB | Refills: 0 | Status: SHIPPED | OUTPATIENT
Start: 2019-06-26

## 2019-06-26 RX ORDER — AMOXICILLIN AND CLAVULANATE POTASSIUM 875; 125 MG/1; MG/1
1 TABLET, FILM COATED ORAL EVERY 12 HOURS
Qty: 13 TAB | Refills: 0 | Status: SHIPPED | OUTPATIENT
Start: 2019-06-26 | End: 2019-06-26

## 2019-06-26 RX ORDER — AMOXICILLIN AND CLAVULANATE POTASSIUM 875; 125 MG/1; MG/1
1 TABLET, FILM COATED ORAL EVERY 12 HOURS
Status: DISCONTINUED | OUTPATIENT
Start: 2019-06-26 | End: 2019-06-26 | Stop reason: HOSPADM

## 2019-06-26 RX ORDER — ACETAMINOPHEN 325 MG/1
325 TABLET ORAL EVERY 6 HOURS PRN
Qty: 30 TAB | Refills: 0 | Status: SHIPPED | OUTPATIENT
Start: 2019-06-26 | End: 2019-06-26

## 2019-06-26 RX ADMIN — AMPICILLIN SODIUM AND SULBACTAM SODIUM 3 G: 2; 1 INJECTION, POWDER, FOR SOLUTION INTRAMUSCULAR; INTRAVENOUS at 04:22

## 2019-06-26 RX ADMIN — CLINDAMYCIN HYDROCHLORIDE 300 MG: 150 CAPSULE ORAL at 05:22

## 2019-06-26 RX ADMIN — OXYCODONE HYDROCHLORIDE 10 MG: 10 TABLET ORAL at 00:01

## 2019-06-26 RX ADMIN — SODIUM CHLORIDE: 9 INJECTION, SOLUTION INTRAVENOUS at 04:22

## 2019-06-26 RX ADMIN — AMPICILLIN SODIUM AND SULBACTAM SODIUM 3 G: 2; 1 INJECTION, POWDER, FOR SOLUTION INTRAMUSCULAR; INTRAVENOUS at 00:01

## 2019-06-26 RX ADMIN — AMOXICILLIN AND CLAVULANATE POTASSIUM 1 TABLET: 875; 125 TABLET, FILM COATED ORAL at 10:06

## 2019-06-26 RX ADMIN — SENNOSIDES, DOCUSATE SODIUM 2 TABLET: 50; 8.6 TABLET, FILM COATED ORAL at 04:22

## 2019-06-26 RX ADMIN — ACETAMINOPHEN 650 MG: 325 TABLET, FILM COATED ORAL at 04:40

## 2019-06-26 NOTE — CARE PLAN
Problem: Safety  Goal: Will remain free from injury  Outcome: PROGRESSING AS EXPECTED  Fall precautions in place, patient educated to sit on edge of bed before jumping out of bed.

## 2019-06-26 NOTE — PROGRESS NOTES
Assumed care of patient. Patient has swelling to left neck and cheek. Dressing in place. Patient denying pain, but stating pressure. Patient nauseous on oral antibiotics, suggested taking with food. No other needs at this time. Call light within reach.

## 2019-06-26 NOTE — PROGRESS NOTES
Discharge paperwork discussed and signed. All questions answered. Patient has appointment on Friday to remove penrose drains x2. Prescriptions sent to pharmacy. Dressing supplies given to patient. Patient verbalized importance of taking antibiotics as prescribed and worsening symptoms to return to ER or call MD for. Patient ambulated out with [facility] housing staff.

## 2019-06-26 NOTE — PROGRESS NOTES
Assumed care at 1900    Received report from day shift RN.    Reviewed recent lab results, notes, orders, and MAR  POC discussed and updated on care board  Bed is in the lowest and locked position, call light within reach        Patient's neck and face are more swollen than this morning.    Penrose drains are still in place   Draining serosanguinous fluid   Gauze changed  Patient had a fever medicated per MAR   Fever broke  Increase pain   Medicated per MAR   Ice pack provided  RA  Denies SOB  Denies numbness and tingling     Tolerating full liquid diet.

## 2019-06-26 NOTE — DISCHARGE SUMMARY
Discharge Summary    CHIEF COMPLAINT ON ADMISSION  Chief Complaint   Patient presents with   • Facial Swelling     wisdom teeth removed last Tuesday       Reason for Admission  Throat swelling     Admission Date  6/24/2019    CODE STATUS  Full Code    HPI & HOSPITAL COURSE  23 y.o. male with past medical history of tobacco use admitted 6/24/2019 with Left facial swelling and pain found to have a dental abscess.  Oral surgery have been consulted, the patient underwent incision and drainage of the left neck abscess and debridement and extraction of tooth site #17. He was initially started on Unasyn and clindamycin, and was transitioned to orals. Wound cultures grew strep. The patient was transitioned to oral Augmentin and clindamycin. His facial swelling improved slightly and was tolerating a diet. His feeling of shortness of breath resolved. He was satruating on room are on the day of discharge. He did tolerate Augmentin however did develop nausea and vomiting with clindamycin, accordingly was discharged with Augmenting. Patient was educated on the risk of developing C. difficile colitis with antibiotic use.  He was educated about the symptoms and when to seek medical help. Patient understands and accepts the risks. He will need follow up with surgery.  Therefore, he is discharged in fair and stable condition to home with close outpatient follow-up.  The patient met 2-midnight criteria for an inpatient stay at the time of discharge.    Discharge Date  6/26/2019     FOLLOW UP ITEMS POST DISCHARGE  Follow up with oral surgery as instructed     DISCHARGE DIAGNOSES  Principal Problem:    Dental abscess POA: Yes  Active Problems:    Tobacco abuse POA: Yes  Resolved Problems:    Dyspnea POA: Unknown    Leukocytosis POA: Yes    Hyperglycemia POA: Unknown    FOLLOW UP  Follow up with oral surgery as instructed    Julian Rivera M.D.  [address]  [place] [id number]  [phone]    Go on 6/28/2019  PLEASE ARRIVE AT  1:15PM FOR YOUR APPOINTMENT. THANK YOU    MEDICATIONS ON DISCHARGE     Medication List      START taking these medications      Instructions   acetaminophen 325 MG Tabs  Commonly known as:  TYLENOL   Take 1 Tab by mouth every 6 hours as needed (Mild Pain; (Pain scale 1-3); Temp greater than 100.5 F).  Dose:  325 mg     amoxicillin-clavulanate 875-125 MG Tabs  Commonly known as:  AUGMENTIN   Take 1 Tab by mouth every 12 hours for 10 days.  Dose:  1 Tab        CONTINUE taking these medications      Instructions   ibuprofen 800 MG Tabs  Commonly known as:  MOTRIN   Take 800 mg by mouth every 8 hours as needed (PAIN).  Dose:  800 mg        STOP taking these medications    acetaminophen-codeine #3 300-30 MG Tabs  Commonly known as:  TYLENOL #3     dexamethasone 4 MG Tabs  Commonly known as:  DECADRON          Allergies  Not on File    DIET  Orders Placed This Encounter   Procedures   • Diet Order Full Liquid     Standing Status:   Standing     Number of Occurrences:   1     Order Specific Question:   Diet:     Answer:   Full Liquid [11]     Order Specific Question:   Miscellaneous modifications:     Answer:   Vegetarian [13]     ACTIVITY  As tolerated.  Weight bearing as tolerated    CONSULTATIONS  Oral surgery     PROCEDURES  Incision and drainage of the left neck abscess and debridement and extraction of tooth site #17     LABORATORY  Lab Results   Component Value Date    SODIUM 139 06/26/2019    POTASSIUM 4.4 06/26/2019    CHLORIDE 103 06/26/2019    CO2 28 06/26/2019    GLUCOSE 100 (H) 06/26/2019    BUN 10 06/26/2019    CREATININE 0.77 06/26/2019      Lab Results   Component Value Date    WBC 15.5 (H) 06/26/2019    HEMOGLOBIN 15.1 06/26/2019    HEMATOCRIT 46.1 06/26/2019    PLATELETCT 357 06/26/2019      Total time of the discharge process exceeds 35 minutes.

## 2019-06-29 LAB
BACTERIA BLD CULT: NORMAL
BACTERIA SPEC ANAEROBE CULT: ABNORMAL
BACTERIA SPEC ANAEROBE CULT: ABNORMAL
SIGNIFICANT IND 70042: ABNORMAL
SIGNIFICANT IND 70042: NORMAL
SITE SITE: ABNORMAL
SITE SITE: NORMAL
SOURCE SOURCE: ABNORMAL
SOURCE SOURCE: NORMAL

## (undated) DEVICE — DRAPE MAGNETIC (INSTRA-MAG) - (30/CA)

## (undated) DEVICE — NEEDLE NON SAFETY 25 GA X 1 1/2 IN HYPO (100EA/BX)

## (undated) DEVICE — TUBE E-T HI-LO CUFF 7.5MM (10EA/PK)

## (undated) DEVICE — LACTATED RINGERS INJ 1000 ML - (14EA/CA 60CA/PF)

## (undated) DEVICE — TUBING CLEARLINK DUO-VENT - C-FLO (48EA/CA)

## (undated) DEVICE — ELECTRODE 850 FOAM ADHESIVE - HYDROGEL RADIOTRNSPRNT (50/PK)

## (undated) DEVICE — SUTURE 2-0 SILK SH (36PK/BX)

## (undated) DEVICE — GLOVE BIOGEL SZ 7.5 SURGICAL PF LTX - (50PR/BX 4BX/CA)

## (undated) DEVICE — DRAPE SURGICAL U 77X120 - (10/CA)

## (undated) DEVICE — SYRINGE EAR/NOSE 3 OZ STERILE (50/CA

## (undated) DEVICE — SUCTION INSTRUMENT YANKAUER BULBOUS TIP W/O VENT (50EA/CA)

## (undated) DEVICE — CANISTER SUCTION 3000ML MECHANICAL FILTER AUTO SHUTOFF MEDI-VAC NONSTERILE LF DISP  (40EA/CA)

## (undated) DEVICE — KIT ANESTHESIA W/CIRCUIT & 3/LT BAG W/FILTER (20EA/CA)

## (undated) DEVICE — SUTURE GENERAL

## (undated) DEVICE — PROTECTOR ULNA NERVE - (36PR/CA)

## (undated) DEVICE — GOWN WARMING STANDARD FLEX - (30/CA)

## (undated) DEVICE — SYRINGE 10 ML CONTROL LL (25EA/BX 4BX/CA)

## (undated) DEVICE — KIT ROOM DECONTAMINATION

## (undated) DEVICE — PACK MINOR BASIN - (2EA/CA)

## (undated) DEVICE — SET EXTENSION WITH 2 PORTS (48EA/CA) ***PART #2C8610 IS A SUBSTITUTE*****

## (undated) DEVICE — SLEEVE, VASO, THIGH, MED

## (undated) DEVICE — GLOVE SZ 6.5 BIOGEL PI MICRO - PF LF (50PR/BX)

## (undated) DEVICE — NEPTUNE 4 PORT MANIFOLD - (20/PK)

## (undated) DEVICE — SET LEADWIRE 5 LEAD BEDSIDE DISPOSABLE ECG (1SET OF 5/EA)

## (undated) DEVICE — GLOVE BIOGEL PI INDICATOR SZ 6.5 SURGICAL PF LF - (50/BX 4BX/CA)

## (undated) DEVICE — HEAD HOLDER JUNIOR/ADULT

## (undated) DEVICE — BLADE SURGICAL #15 - (50/BX 3BX/CA)

## (undated) DEVICE — MASK ANESTHESIA ADULT  - (100/CA)

## (undated) DEVICE — ELECTRODE DUAL RETURN W/ CORD - (50/PK)

## (undated) DEVICE — CHLORAPREP 26 ML APPLICATOR - ORANGE TINT(25/CA)

## (undated) DEVICE — GLOVE BIOGEL PI INDICATOR SZ 7.0 SURGICAL PF LF - (50/BX 4BX/CA)

## (undated) DEVICE — SENSOR SPO2 NEO LNCS ADHESIVE (20/BX) SEE USER NOTES

## (undated) DEVICE — SODIUM CHL IRRIGATION 0.9% 1000ML (12EA/CA)

## (undated) DEVICE — BOVIE NEEDLE TIP INSULATD NON-SAFETY 2CM (50/PK)